# Patient Record
Sex: FEMALE | Race: WHITE | NOT HISPANIC OR LATINO | Employment: UNEMPLOYED | URBAN - METROPOLITAN AREA
[De-identification: names, ages, dates, MRNs, and addresses within clinical notes are randomized per-mention and may not be internally consistent; named-entity substitution may affect disease eponyms.]

---

## 2020-12-30 ENCOUNTER — HOSPITAL ENCOUNTER (EMERGENCY)
Facility: HOSPITAL | Age: 35
Discharge: HOME | End: 2020-12-30
Admitting: EMERGENCY MEDICINE

## 2020-12-30 VITALS
OXYGEN SATURATION: 95 % | RESPIRATION RATE: 18 BRPM | HEART RATE: 79 BPM | SYSTOLIC BLOOD PRESSURE: 137 MMHG | TEMPERATURE: 98.4 F | BODY MASS INDEX: 18.71 KG/M2 | HEIGHT: 63 IN | DIASTOLIC BLOOD PRESSURE: 90 MMHG | WEIGHT: 105.6 LBS

## 2020-12-30 DIAGNOSIS — B88.8 INFESTATION BY BED BUG: Primary | ICD-10-CM

## 2020-12-30 PROCEDURE — 99281 EMR DPT VST MAYX REQ PHY/QHP: CPT

## 2025-01-12 ENCOUNTER — HOSPITAL ENCOUNTER (OUTPATIENT)
Facility: HOSPITAL | Age: 40
Discharge: HOME | End: 2025-01-12
Attending: EMERGENCY MEDICINE | Admitting: EMERGENCY MEDICINE
Payer: MEDICAID

## 2025-01-12 VITALS
WEIGHT: 210 LBS | SYSTOLIC BLOOD PRESSURE: 146 MMHG | OXYGEN SATURATION: 97 % | DIASTOLIC BLOOD PRESSURE: 90 MMHG | TEMPERATURE: 98.6 F | RESPIRATION RATE: 18 BRPM | BODY MASS INDEX: 37.21 KG/M2 | HEIGHT: 63 IN | HEART RATE: 72 BPM

## 2025-01-12 DIAGNOSIS — S05.01XA CONJUNCTIVAL ABRASION, RIGHT, INITIAL ENCOUNTER: ICD-10-CM

## 2025-01-12 DIAGNOSIS — S05.91XA RIGHT EYE INJURY, INITIAL ENCOUNTER: Primary | ICD-10-CM

## 2025-01-12 PROCEDURE — G0463 HOSPITAL OUTPT CLINIC VISIT: HCPCS | Performed by: EMERGENCY MEDICINE

## 2025-01-12 RX ORDER — ERYTHROMYCIN 5 MG/G
OINTMENT OPHTHALMIC NIGHTLY
Qty: 1 G | Refills: 0 | Status: SHIPPED | OUTPATIENT
Start: 2025-01-12 | End: 2025-01-17

## 2025-01-12 RX ORDER — TETRACAINE HYDROCHLORIDE 5 MG/ML
2 SOLUTION OPHTHALMIC ONCE
Status: COMPLETED | OUTPATIENT
Start: 2025-01-12 | End: 2025-01-12

## 2025-01-12 RX ORDER — POLYMYXIN B SULFATE AND TRIMETHOPRIM 1; 10000 MG/ML; [USP'U]/ML
1 SOLUTION OPHTHALMIC 4 TIMES DAILY
Qty: 10 ML | Refills: 0 | Status: SHIPPED | OUTPATIENT
Start: 2025-01-12 | End: 2025-01-17

## 2025-01-12 RX ADMIN — FLUORESCEIN SODIUM 1 STRIP: 1 STRIP OPHTHALMIC at 09:23

## 2025-01-12 RX ADMIN — TETRACAINE HYDROCHLORIDE 2 DROP: 5 SOLUTION OPHTHALMIC at 09:23

## 2025-01-12 NOTE — DISCHARGE INSTRUCTIONS
Instill eyedrops to right eye as prescribed.  Ophthalmic ointment nightly.  Over-the-counter Tylenol and/or ibuprofen as needed for pain.  Return to ER for any emergent concerns.  Follow-up with ophthalmology on Monday or Tuesday for repeat evaluation.

## 2025-01-12 NOTE — FSED PROVIDER NOTE
Subjective   History of Present Illness  Patient is a 39-year-old  female who presents emergency room with complaints of a right eye injury.  Patient states that she was scratched by her domesticated dog in the right eye last night.  Patient reports that she feels like there is a piece of her eye film that rolls when she blinks her eye.  She denies any other injury or trauma.  Her vision is normal.  She did not have any discharge this morning.  Patient states that she put ice on her eye all last night.  She is here for an evaluation.        Review of Systems   Constitutional: Negative.  Negative for chills, fatigue and fever.   Eyes:  Positive for pain and redness.   Respiratory:  Negative for cough, chest tightness and shortness of breath.    Cardiovascular:  Negative for chest pain and palpitations.   Gastrointestinal:  Negative for abdominal pain, diarrhea, nausea and vomiting.   Genitourinary: Negative.    Musculoskeletal: Negative.    Skin: Negative.  Negative for rash.   Neurological: Negative.  Negative for syncope, weakness, numbness and headaches.   Psychiatric/Behavioral: Negative.     All other systems reviewed and are negative.      No past medical history on file.    No Known Allergies    No past surgical history on file.    No family history on file.    Social History     Socioeconomic History    Marital status: Single           Objective   Physical Exam  Vitals and nursing note reviewed.   Constitutional:       General: She is not in acute distress.     Appearance: Normal appearance. She is normal weight.   HENT:      Right Ear: External ear normal.      Left Ear: External ear normal.      Nose: Nose normal.   Eyes:      Conjunctiva/sclera:      Right eye: Right conjunctiva is injected. Chemosis present.      Pupils:      Right eye: Fluorescein uptake present.      Left eye: No fluorescein uptake.      Slit lamp exam:     Right eye: No corneal flare, corneal ulcer, foreign body or hyphema.       Left eye: No corneal flare, corneal ulcer, foreign body or hyphema.        Comments: There is right eye erythema and conjunctival injection noted.   Cardiovascular:      Rate and Rhythm: Normal rate.   Pulmonary:      Effort: Pulmonary effort is normal.   Musculoskeletal:         General: Normal range of motion.      Cervical back: Normal range of motion.   Skin:     Capillary Refill: Capillary refill takes less than 2 seconds.   Neurological:      General: No focal deficit present.      Mental Status: She is alert and oriented to person, place, and time.   Psychiatric:         Mood and Affect: Mood normal.         Procedures           ED Course                                           Medical Decision Making  Patient presents with eye pain and redness.  Patient reports recent eye trauma. Negative Estrellita sign, patient is seen to have fluorescein uptake at the 9:00 position of their right eye.  No significant photophobia.  Nothing on exam to indicate indicate increased intraocular pressure (no steamy cornea, significant eye pain). Given history and exam I have low suspicion for globe rupture, uveitis, HSV keratitis, Endopthalmitist, Foreign Body. Patient has conjunctival abrasion and is treated with appropriate antibiotic ophthalmic therapy.      Problems Addressed:  Conjunctival abrasion, right, initial encounter: complicated acute illness or injury  Right eye injury, initial encounter: complicated acute illness or injury    Risk  Prescription drug management.        Final diagnoses:   Right eye injury, initial encounter   Conjunctival abrasion, right, initial encounter       ED Disposition  ED Disposition       ED Disposition   Discharge    Condition   Stable    Comment   --               [facility] - [address] [address]  [phone]  Call in 2 days  For repeat evaluation         Medication List        New Prescriptions      erythromycin 5 MG/GM  ophthalmic ointment  Commonly known as: ROMYCIN  Administer  to the right eye Every Night for 5 days.     trimethoprim-polymyxin b 10318-9.1 UNIT/ML-% ophthalmic solution  Commonly known as: Polytrim  Administer 1 drop to the right eye 4 (Four) Times a Day for 5 days.               Where to Get Your Medications        These medications were sent to [facility] [id number] - [place] [address] [address] [facility] [address] - [phone] [facility] [phone] [address] [place] [address]      Phone: [phone]   erythromycin 5 MG/GM ophthalmic ointment  trimethoprim-polymyxin b 03553-4.1 UNIT/ML-% ophthalmic solution